# Patient Record
Sex: FEMALE | Race: WHITE
[De-identification: names, ages, dates, MRNs, and addresses within clinical notes are randomized per-mention and may not be internally consistent; named-entity substitution may affect disease eponyms.]

---

## 2018-06-03 ENCOUNTER — HOSPITAL ENCOUNTER (EMERGENCY)
Dept: HOSPITAL 47 - EC | Age: 43
Discharge: HOME | End: 2018-06-03
Payer: COMMERCIAL

## 2018-06-03 VITALS
DIASTOLIC BLOOD PRESSURE: 87 MMHG | HEART RATE: 87 BPM | TEMPERATURE: 98.4 F | RESPIRATION RATE: 18 BRPM | SYSTOLIC BLOOD PRESSURE: 135 MMHG

## 2018-06-03 DIAGNOSIS — F17.200: ICD-10-CM

## 2018-06-03 DIAGNOSIS — R05: ICD-10-CM

## 2018-06-03 DIAGNOSIS — J45.909: ICD-10-CM

## 2018-06-03 DIAGNOSIS — Z88.5: ICD-10-CM

## 2018-06-03 DIAGNOSIS — R09.89: Primary | ICD-10-CM

## 2018-06-03 PROCEDURE — 99284 EMERGENCY DEPT VISIT MOD MDM: CPT

## 2018-06-03 PROCEDURE — 94640 AIRWAY INHALATION TREATMENT: CPT

## 2018-06-03 PROCEDURE — 96372 THER/PROPH/DIAG INJ SC/IM: CPT

## 2018-06-03 NOTE — ED
SOB HPI





- General


Chief Complaint: Shortness of Breath


Stated Complaint: SOB, Cold


Time Seen by Provider: 18 19:17


Source: patient


Mode of arrival: ambulatory


Limitations: no limitations





- History of Present Illness


Initial Comments: 





Patient is a 43-year-old female with a history of asthma presents for 

congestion and coughing.  She states that she has been working on drywall at 

her home and that she was exposed to a lot of.  Other allergens.  Since that 

time today, she has been having a lot of congestion but denies any overt chest 

pain or shortness of breath.  She is also been coughing significantly but no 

fevers or chills.  She also denies any abdominal pain or nausea/vomiting/

diarrhea.





- Related Data


 Home Medications











 Medication  Instructions  Recorded  Confirmed


 


Acetaminophen Tab [Tylenol] 1,000 mg PO Q6HR PRN 16


 


Albuterol Inhaler [Ventolin Hfa 1 - 2 puff INHALATION RT-Q6H PRN 16





Inhaler]   


 


Ranitidine HCl [Zantac] 150 mg PO DAILY PRN 16


 


HYDROcodone/APAP 7.5-325MG [Norco 1 tab PO Q4H PRN 16





7.5-325]   


 


Sennosides [Ex-Lax] 15 mg PO BID PRN 16








 Previous Rx's











 Medication  Instructions  Recorded


 


Docusate [Colace] 100 mg PO BID #30 capsule 16


 


Albuterol Inhaler [Ventolin Hfa 1 - 2 puff INHALATION Q6HR PRN #1 18





Inhaler] inhaler 


 


Albuterol Nebulized [Ventolin 2.5 mg INHALATION Q4H PRN #30 nebu 18





Nebulized]  


 


predniSONE 50 mg PO DAILY #5 tablet 18











 Allergies











Allergy/AdvReac Type Severity Reaction Status Date / Time


 


hydrocodone bitartrate AdvReac Unknown Itching Verified 18 19:06





[From Vicodin]     














Review of Systems


ROS Statement: 


Those systems with pertinent positive or pertinent negative responses have been 

documented in the HPI.


Constitutional: Negative for chills, fatigue and fever.





HENT: Positive for congestion. 





Respiratory: Negative for chest tightness, shortness of breath and wheezing.  

Positive for cough





Cardiovascular: Negative for chest pain and palpitations.





Gastrointestinal: Negative for abdominal pain. Negative for abdominal distention

, diarrhea, nausea and vomiting.





Genitourinary: Negative for dysuria.





Musculoskeletal: Negative for back pain, neck pain and neck stiffness.





Skin: Negative for color change.





Neurological: Negative for dizziness, speech difficulty, weakness and light-

headedness.





Psychiatric/Behavioral: Negative for agitation and confusion. The patient is 

not nervous/anxious.


ROS Other: All systems not noted in ROS Statement are negative.





Past Medical History


Past Medical History: Asthma, Fibromyalgia, GERD/Reflux, GI Bleed, 

Musculoskeletal Disorder


Additional Past Medical History / Comment(s): buldging disc, hx of anemia., pt 

states she fell and has right knee pain and swelling right lower leg and foot, 

states she is following with her dr.  PT STATES THAT 16 SHE EXPERIENCED 

MIDSTERNAL CHEST PAIN AND A FAST HEART BEAT;  FELLS OK NOW.


History of Any Multi-Drug Resistant Organisms: None Reported


Past Surgical History:  Section, Tubal Ligation, Uterine Ablation


Additional Past Surgical History / Comment(s): hemmoroidectomy 2016


Past Anesthesia/Blood Transfusion Reactions: Postoperative Nausea & Vomiting (

PONV)


Past Psychological History: No Psychological Hx Reported


Smoking Status: Current every day smoker


Past Alcohol Use History: Rare


Past Drug Use History: None Reported





- Past Family History


  ** Mother


Family Medical History: Cancer


Additional Family Medical History / Comment(s):  of brain cancer





  ** Father


Family Medical History: Cancer


Additional Family Medical History / Comment(s): throat cancer





General Exam





- General Exam Comments


Initial Comments: 





Constitutional: Pt is oriented to person, place, and time. Pt appears well-

developed and well-nourished. No distress.





HENT:





Head: Normocephalic and atraumatic.





Eyes: EOM are normal.





Neck: Normal range of motion. Neck supple.





Cardiovascular: Normal rate, regular rhythm, S1 normal, S2 normal and normal 

heart sounds.  Exam reveals no gallop and no friction rub. No murmur heard.





Pulmonary/Chest: Effort normal and breath sounds normal. No tachypnea and no 

bradypnea. No respiratory distress. No wheezes or rales noted.





Abdominal: Soft. Bowel sounds are normal. Pt exhibits no shifting dullness, no 

distension, no pulsatile liver, no fluid wave, no abdominal bruit and no 

ascites. There is no tenderness. There is no rigidity, no rebound, no guarding, 

no tenderness at McBurney's point and negative Mascorro's sign.





Musculoskeletal: Normal range of motion.





Neurological: Pt is alert and oriented to person, place, and time. No cranial 

nerve deficit.





Skin: Skin is warm and dry. No rash noted. Pt is not diaphoretic. No erythema. 

No pallor.





Psychiatric: Pt has a normal mood and affect. Pt behavior is normal. Thought 

content normal.


Limitations: no limitations





Course


 Vital Signs











  18





  19:05 19:19 19:40


 


Temperature 98.0 F  


 


Pulse Rate 100  102 H


 


Respiratory 20 20 





Rate   


 


Blood Pressure 138/84  


 


O2 Sat by Pulse 98  





Oximetry   














  18





  19:47


 


Temperature 


 


Pulse Rate 98


 


Respiratory 





Rate 


 


Blood Pressure 


 


O2 Sat by Pulse 





Oximetry 














Medical Decision Making





- Medical Decision Making





Patient was given Solu-Medrol as well as breathing treatment and stated that 

her breathing had completely resolved.  Also, there is no suggestion or 

evidence that would be concerning for pulmonary embolism as the patient was 

parked negative.  Patient was given a prescription for steroids as well as 

albuterol and advised to follow up with PCP in the next 1-2 days.  Additionally

, EKG was not performed as the patient had no chest pain.  Patient was 

agreeable to plan.





Disposition


Clinical Impression: 


 Respiratory tract congestion with cough





Disposition: HOME SELF-CARE


Condition: Good


Instructions:  Asthma (ED)


Prescriptions: 


Albuterol Inhaler [Ventolin Hfa Inhaler] 1 - 2 puff INHALATION Q6HR PRN #1 

inhaler


 PRN Reason: Wheezing


Albuterol Nebulized [Ventolin Nebulized] 2.5 mg INHALATION Q4H PRN #30 nebu


 PRN Reason: Wheezing


predniSONE 50 mg PO DAILY #5 tablet


Is patient prescribed a controlled substance at d/c from ED?: No


Referrals: 


Seferino Barrera DO [Primary Care Provider] - 1-2 days


Time of Disposition: 20:18

## 2020-10-14 ENCOUNTER — HOSPITAL ENCOUNTER (EMERGENCY)
Dept: HOSPITAL 47 - EC | Age: 45
Discharge: HOME | End: 2020-10-14
Payer: COMMERCIAL

## 2020-10-14 VITALS
TEMPERATURE: 98 F | RESPIRATION RATE: 18 BRPM | SYSTOLIC BLOOD PRESSURE: 156 MMHG | DIASTOLIC BLOOD PRESSURE: 95 MMHG | HEART RATE: 96 BPM

## 2020-10-14 DIAGNOSIS — Z88.8: ICD-10-CM

## 2020-10-14 DIAGNOSIS — K21.9: ICD-10-CM

## 2020-10-14 DIAGNOSIS — J45.909: ICD-10-CM

## 2020-10-14 DIAGNOSIS — M54.16: Primary | ICD-10-CM

## 2020-10-14 DIAGNOSIS — F17.200: ICD-10-CM

## 2020-10-14 PROCEDURE — 96372 THER/PROPH/DIAG INJ SC/IM: CPT

## 2020-10-14 PROCEDURE — 99283 EMERGENCY DEPT VISIT LOW MDM: CPT

## 2020-10-14 NOTE — ED
Back Pain HPI





- General


Chief Complaint: Back Pain/Injury


Stated Complaint: BACK PAIN


Time Seen by Provider: 10/14/20 10:50


Source: patient


Limitations: no limitations





- History of Present Illness


Initial Comments: 


44yo female presenting to the ER today for cc of left leg pain. Patient states 

that she has a history of sciatica and it usually runs down the posterior right 

leg. Comes and goes, hx of disc herniation. Patient states that now the pain is 

not on the right but the left leg identical symptoms. Sharp burning pain of the 

left posterior leg. Denies falls, trauma, hx of cancer, IVDU, denies fevers, 

denies leg weakness/paralysis or sensation deficits. She states at times it 

tingles. She denies loss of bowel control, urinary retention. Denies coolness, 

or pallor of the extremity. Patient appears well nontoxic and is ambulatory on 

arrival.








- Related Data


                                Home Medications











 Medication  Instructions  Recorded  Confirmed


 


Acetaminophen Tab [Tylenol] 1,000 mg PO Q6HR PRN 16


 


Albuterol Inhaler (Mhu) [Ventolin 1 - 2 puff INHALATION RT-Q6H PRN 16





Hfa Inhaler (Mhu)]   


 


Ranitidine HCl [Zantac] 150 mg PO DAILY PRN 16


 


HYDROcodone/APAP 7.5-325MG [Norco 1 tab PO Q4H PRN 16





7.5-325]   


 


Sennosides [Ex-Lax] 15 mg PO BID PRN 16








                                  Previous Rx's











 Medication  Instructions  Recorded


 


Docusate [Colace] 100 mg PO BID #30 capsule 16


 


Albuterol Inhaler (Mhu) [Ventolin 1 - 2 puff INHALATION Q6HR PRN #1 18





Hfa Inhaler (Mhu)] inhaler 


 


Albuterol Nebulized [Ventolin 2.5 mg INHALATION Q4H PRN #30 nebu 18





Nebulized]  


 


predniSONE 50 mg PO DAILY #5 tablet 18


 


predniSONE 50 mg PO DAILY 3 Days #3 tablet 10/14/20











                                    Allergies











Allergy/AdvReac Type Severity Reaction Status Date / Time


 


hydrocodone bitartrate AdvReac Unknown Itching Verified 10/14/20 10:43





[From Vicodin]     














Review of Systems


ROS Statement: 


Those systems with pertinent positive or pertinent negative responses have been 

documented in the HPI.





ROS Other: All systems not noted in ROS Statement are negative.





Past Medical History


Past Medical History: Asthma, Fibromyalgia, GERD/Reflux, GI Bleed, Muscul

oskeletal Disorder


Additional Past Medical History / Comment(s): buldging disc, hx of anemia., pt 

states she fell and has right knee pain and swelling right lower leg and foot, 

states she is following with her dr.  PT STATES THAT 16 SHE EXPERIENCED 

MIDSTERNAL CHEST PAIN AND A FAST HEART BEAT;  FELLS OK NOW.


History of Any Multi-Drug Resistant Organisms: None Reported


Past Surgical History:  Section, Tubal Ligation, Uterine Ablation


Additional Past Surgical History / Comment(s): hemmoroidectomy 2016


Past Anesthesia/Blood Transfusion Reactions: Postoperative Nausea & Vomiting 

(PONV)


Past Psychological History: No Psychological Hx Reported


Smoking Status: Current every day smoker


Past Alcohol Use History: Occasional, Rare


Past Drug Use History: None Reported





- Past Family History


  ** Mother


Family Medical History: Cancer


Additional Family Medical History / Comment(s):  of brain cancer





  ** Father


Family Medical History: Cancer


Additional Family Medical History / Comment(s): throat cancer





General Exam





- General Exam Comments


Initial Comments: 


General:  The patient is awake and alert, in no distress


Eye:  +3 mm pupils are equal, round and reactive to light, extra-ocular 

movements are intact.  No nystagmus.  There is normal conjunctiva bilaterally.  

No signs of icterus.  


Ears, nose, mouth and throat:  There are moist mucous membranes and no oral 

lesions. 


Neck:  The neck is supple, there is no tenderness or JVD.  


Cardiovascular:  There is a regular rate and rhythm. No murmur, rub or gallop is

appreciated.


Respiratory:  Lungs are clear to auscultation, respirations are non-labored, 

breath sounds are equal.  No wheezes, stridor, rales, or rhonchi.


Gastrointestinal:  Soft, non-distended, non-tender abdomen without masses or 

organomegaly noted. There is no rebound or guarding present.  


Musculoskeletal:  Some paraspinal but no appreciated midline lower lumbar pain 

to palpation. Normal inspection. Normal ROM, of the LE b/l but there is + left 

sided SLR.  Strength 5/5 of the LE b/l. Sensation intact of the LE b/l. +2/5 

achilles/patellar reflexes. Radial and DP pulses equal bilaterally 2+.  


Neurological:  A&O x 3. CN II-XII intact grossly, There are no obvious motor or 

sensory deficits. Coordination appears grossly intact. Speech is normal.


Skin:  Skin is warm and dry and no rashes or lesions are noted. 


Psychiatric:  Cooperative, appropriate mood & affect, normal judgment.  





Limitations: no limitations





Course


                                   Vital Signs











  10/14/20





  10:43


 


Temperature 98 F


 


Pulse Rate 96


 


Respiratory 18





Rate 


 


Blood Pressure 156/95


 


O2 Sat by Pulse 98





Oximetry 














Medical Decision Making





- Medical Decision Making


Left leg pain, no trauma.  Neurovascularly intact.  Discussed imaging with 

patient at this time she would just like steroids/symptomatic treatment. No 

findigns on PE/history concerning for cauda equina at this time. Patient 

ambulatory well appearing. Patient agreeable to discharge with PCP f/u. Return 

for any concerning new symptoms which were discussed at length. Patient 

discharged appearing well.








Disposition


Clinical Impression: 


 Radiculopathy





Disposition: HOME SELF-CARE


Condition: Good


Instructions (If sedation given, give patient instructions):  Sciatica (ED)


Additional Instructions: 


Please use medication as discussed.  Please follow-up with family doctor in the 

next 2 days..  Please return to emergency room if the symptoms increase or 

worsen or for any other concerns.


Prescriptions: 


predniSONE 50 mg PO DAILY 3 Days #3 tablet


Is patient prescribed a controlled substance at d/c from ED?: No


Referrals: 


Seferino Barrera DO [Primary Care Provider] - 1-2 days


Time of Disposition: 11:01

## 2025-03-07 ENCOUNTER — HOSPITAL ENCOUNTER (EMERGENCY)
Dept: HOSPITAL 47 - EC | Age: 50
Discharge: HOME | End: 2025-03-07
Payer: COMMERCIAL

## 2025-03-07 VITALS — SYSTOLIC BLOOD PRESSURE: 119 MMHG | HEART RATE: 76 BPM | DIASTOLIC BLOOD PRESSURE: 78 MMHG | TEMPERATURE: 98.1 F

## 2025-03-07 VITALS — RESPIRATION RATE: 18 BRPM

## 2025-03-07 DIAGNOSIS — T78.1XXA: Primary | ICD-10-CM

## 2025-03-07 DIAGNOSIS — Z91.010: ICD-10-CM

## 2025-03-07 DIAGNOSIS — F17.200: ICD-10-CM

## 2025-03-07 LAB
ALBUMIN SERPL-MCNC: 4.1 G/DL (ref 3.5–5)
ALP SERPL-CCNC: 109 U/L (ref 38–126)
ALT SERPL-CCNC: 25 U/L (ref 4–34)
ANION GAP SERPL CALC-SCNC: 10 MMOL/L
AST SERPL-CCNC: 21 U/L (ref 14–36)
BASOPHILS # BLD AUTO: 0 K/UL (ref 0–0.2)
BASOPHILS NFR BLD AUTO: 0 %
BUN SERPL-SCNC: 16 MG/DL (ref 7–17)
CALCIUM SPEC-MCNC: 10 MG/DL (ref 8.4–10.2)
CHLORIDE SERPL-SCNC: 101 MMOL/L (ref 98–107)
CO2 SERPL-SCNC: 26 MMOL/L (ref 22–30)
EOSINOPHIL # BLD AUTO: 1 K/UL (ref 0–0.7)
EOSINOPHIL NFR BLD AUTO: 9 %
ERYTHROCYTE [DISTWIDTH] IN BLOOD BY AUTOMATED COUNT: 4.8 M/UL (ref 3.8–5.4)
ERYTHROCYTE [DISTWIDTH] IN BLOOD: 15.6 % (ref 11.5–15.5)
GLUCOSE SERPL-MCNC: 133 MG/DL (ref 74–99)
HCT VFR BLD AUTO: 40.3 % (ref 34–46)
HGB BLD-MCNC: 12.5 GM/DL (ref 11.4–16)
LIPASE SERPL-CCNC: 50 U/L (ref 23–300)
LYMPHOCYTES # SPEC AUTO: 2.1 K/UL (ref 1–4.8)
LYMPHOCYTES NFR SPEC AUTO: 19 %
MCH RBC QN AUTO: 26 PG (ref 25–35)
MCHC RBC AUTO-ENTMCNC: 31 G/DL (ref 31–37)
MCV RBC AUTO: 84 FL (ref 80–100)
MONOCYTES # BLD AUTO: 0.4 K/UL (ref 0–1)
MONOCYTES NFR BLD AUTO: 3 %
NEUTROPHILS # BLD AUTO: 7.5 K/UL (ref 1.3–7.7)
NEUTROPHILS NFR BLD AUTO: 68 %
PLATELET # BLD AUTO: 406 K/UL (ref 150–450)
POTASSIUM SERPL-SCNC: 3.8 MMOL/L (ref 3.5–5.1)
PROT SERPL-MCNC: 7.4 G/DL (ref 6.3–8.2)
SODIUM SERPL-SCNC: 137 MMOL/L (ref 137–145)
WBC # BLD AUTO: 11.2 K/UL (ref 3.8–10.6)

## 2025-03-07 PROCEDURE — 99284 EMERGENCY DEPT VISIT MOD MDM: CPT

## 2025-03-07 PROCEDURE — 80053 COMPREHEN METABOLIC PANEL: CPT

## 2025-03-07 PROCEDURE — 93005 ELECTROCARDIOGRAM TRACING: CPT

## 2025-03-07 PROCEDURE — 85025 COMPLETE CBC W/AUTO DIFF WBC: CPT

## 2025-03-07 PROCEDURE — 36415 COLL VENOUS BLD VENIPUNCTURE: CPT

## 2025-03-07 PROCEDURE — 96375 TX/PRO/DX INJ NEW DRUG ADDON: CPT

## 2025-03-07 PROCEDURE — 96374 THER/PROPH/DIAG INJ IV PUSH: CPT

## 2025-03-07 PROCEDURE — 96361 HYDRATE IV INFUSION ADD-ON: CPT

## 2025-03-07 PROCEDURE — 74177 CT ABD & PELVIS W/CONTRAST: CPT

## 2025-03-07 PROCEDURE — 83690 ASSAY OF LIPASE: CPT

## 2025-03-07 RX ADMIN — CEFAZOLIN STA MLS/HR: 330 INJECTION, POWDER, FOR SOLUTION INTRAMUSCULAR; INTRAVENOUS at 14:46

## 2025-03-07 RX ADMIN — ONDANSETRON STA MG: 2 INJECTION INTRAMUSCULAR; INTRAVENOUS at 15:00

## 2025-03-07 RX ADMIN — LIDOCAINE HYDROCHLORIDE ONE ML: 20 SOLUTION ORAL; TOPICAL at 16:31

## 2025-03-07 RX ADMIN — DIPHENHYDRAMINE HYDROCHLORIDE STA MG: 50 INJECTION, SOLUTION INTRAMUSCULAR; INTRAVENOUS at 14:47

## 2025-03-07 RX ADMIN — MORPHINE SULFATE PRN MG: 4 INJECTION, SOLUTION INTRAMUSCULAR; INTRAVENOUS at 14:53

## 2025-03-07 RX ADMIN — ACETAMINOPHEN PRN ML: 160 SOLUTION ORAL at 16:31

## 2025-03-07 RX ADMIN — DEXTROSE STA MG: 50 INJECTION, SOLUTION INTRAVENOUS at 14:51

## 2025-03-07 RX ADMIN — FAMOTIDINE STA MG: 10 INJECTION, SOLUTION INTRAVENOUS at 14:49

## 2025-03-07 RX ADMIN — HYOSCYAMINE SULFATE STA MG: 0.12 TABLET ORAL at 16:31

## 2025-03-07 NOTE — CT
EXAMINATION TYPE: CT abdomen pelvis w con

 

DATE OF EXAM: 3/7/2025 3:52 PM

 

COMPARISON: None

 

CLINICAL INDICATION: Female, 49 years old with history of abdominal pain;

 

TECHNIQUE:  Axial CT abdomen pelvis w con;Sagittal and coronal reformats were created on a separate w
orkstation. 

Contrast used: mL of , (none if empty)

Oral contrast used: (none if empty)

CT DLP: 2257 mGycm, Automated exposure control for dose reduction was used.

 

FINDINGS: 

LOWER CHEST: Unremarkable

 

ABDOMEN

LIVER: Unremarkable

GALLBLADDER AND BILE DUCTS: Unremarkable.

PANCREAS: Unremarkable.

SPLEEN: Unremarkable.

ADRENAL GLANDS: Unremarkable.

KIDNEYS AND URETERS: No evidence for this obstructive uropathy. Nonobstructing left renal calculi gray
suring up to 4 mm. PELVIS

BLADDER: No evidence for wall thickening or mass given limitations of exam.

REPRODUCTIVE: Dominant right ovarian follicle measuring up to 17 mm.

 

ABDOMEN & PELVIS

STOMACH AND BOWEL: No evidence of bowel obstruction. The appendix is visualized and normal.

PERITONEUM/RETROPERITONEUM: No evidence of pneumoperitoneum or free fluid.

VASCULATURE: No evidence of aortic aneurysm. 

MUSCULOSKELETAL: No acute osseous abnormalities. Mild disc degeneration changes are present throughou
t the thoracolumbar spine. Grade 2 anterolisthesis of L5 on S1 there is bilateral spondylolysis. Deep
 degeneration of the spine is worse at L1-L2 with sclerosis and complete loss of disc space.

LYMPH NODES: No gross evidence for lymphadenopathy.

SOFT TISSUE/ABDOMINAL WALL: Fat-containing umbilical hernia.

 

IMPRESSION:

1.  No evidence for acute process.

2.  The appendix is normal.

3.  No obstructing left renal calculi measuring up to 4 mm.

4.  Grade 2 anterolisthesis of L5 on S1 with bilateral spondylolysis.

 

[facility], Workstation: [id number], 3/7/2025 3:56 PM

## 2025-03-07 NOTE — ED
General Adult HPI





- General


Chief complaint: Nausea/Vomiting/Diarrhea


Stated complaint: Vomiting,Dizziness


Time Seen by Provider: 25 14:23


Source: patient


Mode of arrival: ambulatory


Limitations: no limitations





- History of Present Illness


Initial comments: 


Dictation was produced using dragon dictation software. please excuse any 

grammatical, word or spelling errors. 











Chief Complaint: 49-year-old female presents emergency department abdominal pain





History of Present Illness: Patient is a 49-year-old female presents emergency 

department with abdominal pain.  She had a peanut butter sandwich and 

immediately started to have some nausea, vomiting and epigastric abdominal pain.

 Patient Nuys any allergies to peanut butter.  She had a similar though milder 

reaction 2 days ago after having peanut butter.  Last night she had chicken 

Dipak with no issues.  She does not have any history of abdominal surgery.  

She states that she did not react any further last night.  She does not have a 

reaction to female.  States that the pain is epigastric.








The ROS documented in this emergency department record has been reviewed and 

confirmed by me.  Those systems with pertinent positive or negative responses 

have been documented in the HPI.  All other systems are other negative and/or 

noncontributory.




















- Related Data


                                Home Medications











 Medication  Instructions  Recorded  Confirmed


 


Furosemide [Lasix] 20 mg PO DAILY 22


 


HYDROcodone/APAP 10-325MG [Norco 1 tab PO Q6HR PRN 22





]   


 


Ketorolac [Toradol] 10 mg PO Q6HR PRN 22


 


Tamsulosin [Flomax] 0.4 mg PO HS 22


 


diphenhydrAMINE [Benadryl] 25 mg PO BID PRN 22








                                  Previous Rx's











 Medication  Instructions  Recorded


 


Albuterol Inhaler [Ventolin Hfa 1 - 2 puff INHALATION Q6HR PRN #1 18





Inhaler] inhaler 


 


Albuterol Nebulized [Ventolin 2.5 mg INHALATION Q4H PRN #30 nebu 18





Nebulized]  











                                    Allergies











Allergy/AdvReac Type Severity Reaction Status Date / Time


 


No Known Allergies Allergy   Verified 25 14:22














Review of Systems


ROS Statement: 


Those systems with pertinent positive or pertinent negative responses have been 

documented in the HPI.





ROS Other: All systems not noted in ROS Statement are negative.





Past Medical History


Past Medical History: Asthma, Fibromyalgia, Musculoskeletal Disorder


Additional Past Medical History / Comment(s): bulging disc, hx of anemia., 

retaining fluid lower legs, recent poison oak lower legs-mostly resolved, just 

finished steroids for, kidney stones


History of Any Multi-Drug Resistant Organisms: None Reported


Past Surgical History:  Section, Tubal Ligation, Uterine Ablation


Additional Past Surgical History / Comment(s): hemorroidectomy 2016


Past Anesthesia/Blood Transfusion Reactions: Postoperative Nausea & Vomiting 

(PONV)


Past Psychological History: No Psychological Hx Reported


Smoking Status: Current every day smoker


Past Alcohol Use History: None Reported


Past Drug Use History: None Reported





- Past Family History


  ** Mother


Family Medical History: Cancer


Additional Family Medical History / Comment(s):  of brain cancer





  ** Father


Family Medical History: Cancer


Additional Family Medical History / Comment(s): throat cancer





General Exam





- General Exam Comments


Initial Comments: 











PHYSICAL EXAM:


General Impression: Alert and oriented x3, acute distress secondary to pain


HEENT: Normocephalic atraumatic, extra-ocular movements intact, pupils equal and

reactive to light bilaterally, mucous membranes moist.


Cardiovascular: Heart regular rate and rhythm


Chest: Able to complete full sentences, no retractions, no tachypnea


Abdomen: abdomen soft, diffuse palpatory abdominal tenderness non-distended, no 

organomegaly


Musculoskeletal: Pulses present and equal in all extremities, no peripheral e

cindy


Motor:  no focal deficits noted


Neurological: CN II-XII grossly intact, no focal motor or sensory deficits noted


Skin: Intact with no visualized rashes


Psych: Normal affect and mood











Limitations: no limitations





Course


                                   Vital Signs











  25





  14:19 16:28


 


Temperature 98.2 F 98.3 F


 


Pulse Rate 78 84


 


Respiratory 24 18





Rate  


 


Blood Pressure 150/62 131/72


 


O2 Sat by Pulse 97 95





Oximetry  














EKG Findings





- EKG Comments:


EKG Findings:: My EKG interpretation: Ventricular rate 84, sinus rhythm, ,

, QTc 433, right bundle branch block. No AL prolongation, no QTC 

prolongation, no ST or T-wave changes noted. Overall, this EKG is unremarkable





Medical Decision Making





- Medical Decision Making


Was pt. sent in by a medical professional or institution (, PA, NP, urgent 

care, hospital, or nursing home...) When possible be specific


@  -No


Did you speak to anyone other than the patient for history (EMS, parent, family,

police, friend...)? What history was obtained from this source 


@  -No


Did you review nursing and triage notes (agree or disagree)?  Why? 


@  -I reviewed and agree with nursing and triage notes


Were old charts reviewed (outside hosp., previous admission, EMS record, old 

EKG, old radiological studies, urgent care reports/EKG's, nursing home records)?

Report findings 


@  -No old charts were reviewed


Differential Diagnosis (chest pain, altered mental status, abdominal pain women,

abdominal pain men, vaginal bleeding, musculoskeletal, weakness, fever, dyspnea,

syncope, headache, dizziness, GI bleed, back pain, seizure, CVA, palpatations, 

mental health)? 


@  -Differential Abdominal Pain Women:


Appendicitis, Cholecystitis, diverticulosis, ischemic bowel, pancreatitis, 

hepatitis, UTI, gastroenteritis, AAA, incarcerated hernia, bowel obstruction, 

constipation, inflammatory bowel, hepatitis, peptic ulcer disease, splenic 

infarction, perforated viscus, vulvitis, ovarian torsion, PID, kidney stone, 

placenta abruption, this is not meant to be an all-inclusive list





EKG interpreted by me (3pts min.).


@  -See above


X-rays interpreted by me (1pt min.).


@  -None done


CT interpreted by me (1pt min.).


@  -CT of the M pelvis shows no acute processes


U/S interpreted by me (1pt. min.).


@  -None done


What testing was considered but not performed or refused? (CT, X-rays, U/S, 

labs)? Why?


@  -None


What meds were considered but not given or refused? Why?


@  -None


Was smoking cessation discussed for >3mins.?


@  -No


Were there social determinants of health that impacted care today? How? 

(Homelessness, low income, unemployed, alcoholism, drug addiction, hernandez

sportation, low edu. Level, literacy, decrease access to med. care, shelter, 

rehab)?


@  -No


Was there de-escalation of care discussed even if they declined (Discuss DNR or 

withdrawal of care, Hospice)? DNR status


@  -No


What co-morbidities impacted this encounter? (DM, HTN, Smoking, COPD, CAD, 

Cancer, CVA, ARF, Chemo, Hep., AIDS, mental health diagnosis, sleep apnea, 

morbid obesity)?


@  -None


Was patient admitted / discharged? Hospital course, mention meds given and 

route, prescriptions, significant lab abnormalities, going to OR and other 

pertinent info.


@  -41-year-old female presents emergency department after abdominal pain after 

eating peanut butter.  Vital signs are stable.  Patient has tender abdomen.  

Patient only has isolated abdominal symptoms.  She has no skin symptoms throat 

or airway breathing lightheadedness symptoms.  She is not hypotensive.  No 

concern for anaphylaxis given that she has only 1 organ system involvement.  

Laboratory evaluation is unremarkable.  Patient given allergy cocktail.  CT ab 

pelvis obtained showing no acute processes.  Patient given GI cocktail.  Patient

reevaluated at 5:07 PM with improvement of symptoms.  Patient discharged told to

avoid peanut butter.  Patient counseled on symptoms of anaphylaxis and she has 

strict return precautions otherwise patient told to avoid peanut butter at this 

time.


Did you discuss the management of the patient with other professionals 

(professionals i.e. , PA, NP, lab, RT, psych nurse, , , 

teacher, , )? Give summary


@  -No


Was critical care preformed (if so, how long)?


@  -No


Undiagnosed new problem with uncertain prognosis?


@  -No


Drug Therapy requiring intensive monitoring for toxicity (Heparin, Nitro, 

Insulin, Cardizem)?


@  -No


Were any procedures done?


@  -No


Diagnosis/symptom?  Acute, or Chronic, or Acute on Chronic?  Uncomplicated 

(without systemic symptoms) or Complicated (systemic symptoms)?


@  -Allergic reaction


Side effects of treatment?


@  -No


Exacerbation, Progression, or Severe Exacerbation?


@  -No


Poses a threat to life or bodily function? How? (Chest pain, USA, MI, pneumonia,

PE, COPD, DKA, ARF, appy, cholecystitis, CVA, Diverticulitis, Homicidal, 

Suicidal, threat to staff... and all critical care pts)


@  -yes











- Lab Data


Result diagrams: 


                                 25 14:45





                                 25 14:45


                                   Lab Results











  25 Range/Units





  14:45 14:45 


 


WBC  11.2 H   (3.8-10.6)  k/uL


 


RBC  4.80   (3.80-5.40)  m/uL


 


Hgb  12.5   (11.4-16.0)  gm/dL


 


Hct  40.3   (34.0-46.0)  %


 


MCV  84.0   (80.0-100.0)  fL


 


MCH  26.0   (25.0-35.0)  pg


 


MCHC  31.0   (31.0-37.0)  g/dL


 


RDW  15.6 H   (11.5-15.5)  %


 


Plt Count  406   (150-450)  k/uL


 


MPV  6.9   


 


Neutrophils %  68   %


 


Lymphocytes %  19   %


 


Monocytes %  3   %


 


Eosinophils %  9   %


 


Basophils %  0   %


 


Neutrophils #  7.5   (1.3-7.7)  k/uL


 


Lymphocytes #  2.1   (1.0-4.8)  k/uL


 


Monocytes #  0.4   (0-1.0)  k/uL


 


Eosinophils #  1.0 H   (0-0.7)  k/uL


 


Basophils #  0.0   (0-0.2)  k/uL


 


Hypochromasia  Moderate   


 


Sodium   137  (137-145)  mmol/L


 


Potassium   3.8  (3.5-5.1)  mmol/L


 


Chloride   101  ()  mmol/L


 


Carbon Dioxide   26  (22-30)  mmol/L


 


Anion Gap   10  mmol/L


 


BUN   16  (7-17)  mg/dL


 


Creatinine   0.80  (0.52-1.04)  mg/dL


 


Est GFR (CKD-EPI)AfAm   >90  (>60 ml/min/1.73 sqM)  


 


Est GFR (CKD-EPI)NonAf   88  (>60 ml/min/1.73 sqM)  


 


Glucose   133 H  (74-99)  mg/dL


 


Calcium   10.0  (8.4-10.2)  mg/dL


 


Total Bilirubin   0.3  (0.2-1.3)  mg/dL


 


AST   21  (14-36)  U/L


 


ALT   25  (4-34)  U/L


 


Alkaline Phosphatase   109  ()  U/L


 


Total Protein   7.4  (6.3-8.2)  g/dL


 


Albumin   4.1  (3.5-5.0)  g/dL


 


Lipase   50  ()  U/L














Disposition


Clinical Impression: 


 Food allergy





Disposition: HOME SELF-CARE


Condition: Fair


Instructions (If sedation given, give patient instructions):  Peanut Allergy 

(ED)


Is patient prescribed a controlled substance at d/c from ED?: No


Referrals: 


None,Stated [Primary Care Provider] - 1-2 days


Time of Disposition: 17:08